# Patient Record
Sex: MALE | Race: BLACK OR AFRICAN AMERICAN | URBAN - METROPOLITAN AREA
[De-identification: names, ages, dates, MRNs, and addresses within clinical notes are randomized per-mention and may not be internally consistent; named-entity substitution may affect disease eponyms.]

---

## 2021-12-03 ENCOUNTER — APPOINTMENT (RX ONLY)
Dept: URBAN - METROPOLITAN AREA CLINIC 79 | Facility: CLINIC | Age: 26
Setting detail: DERMATOLOGY
End: 2021-12-03

## 2021-12-03 DIAGNOSIS — L50.8 OTHER URTICARIA: ICD-10-CM | Status: INADEQUATELY CONTROLLED

## 2021-12-03 DIAGNOSIS — B36.0 PITYRIASIS VERSICOLOR: ICD-10-CM | Status: INADEQUATELY CONTROLLED

## 2021-12-03 PROCEDURE — ? PRESCRIPTION

## 2021-12-03 PROCEDURE — ? FULL BODY SKIN EXAM - DECLINED

## 2021-12-03 PROCEDURE — ? COUNSELING

## 2021-12-03 PROCEDURE — 99204 OFFICE O/P NEW MOD 45 MIN: CPT

## 2021-12-03 RX ORDER — PREDNISONE 20 MG/1
TABLET ORAL
Qty: 30 | Refills: 0 | Status: ERX | COMMUNITY
Start: 2021-12-03

## 2021-12-03 RX ORDER — SALICYLIC ACID 3 G/100G
LOTION TOPICAL
Qty: 60 | Refills: 3 | Status: ERX | COMMUNITY
Start: 2021-12-03

## 2021-12-03 RX ORDER — KETOCONAZOLE 20 MG/G
CREAM TOPICAL BID
Qty: 60 | Refills: 3 | Status: ERX | COMMUNITY
Start: 2021-12-03

## 2021-12-03 RX ORDER — KETOCONAZOLE 20 MG/ML
SHAMPOO, SUSPENSION TOPICAL BID
Qty: 120 | Refills: 3 | Status: ERX | COMMUNITY
Start: 2021-12-03

## 2021-12-03 RX ADMIN — SALICYLIC ACID: 3 LOTION TOPICAL at 00:00

## 2021-12-03 RX ADMIN — PREDNISONE: 20 TABLET ORAL at 00:00

## 2021-12-03 RX ADMIN — KETOCONAZOLE: 20 CREAM TOPICAL at 00:00

## 2021-12-03 RX ADMIN — KETOCONAZOLE: 20 SHAMPOO, SUSPENSION TOPICAL at 00:00

## 2021-12-03 ASSESSMENT — LOCATION ZONE DERM
LOCATION ZONE: LEG
LOCATION ZONE: TRUNK
LOCATION ZONE: ARM

## 2021-12-03 ASSESSMENT — LOCATION SIMPLE DESCRIPTION DERM
LOCATION SIMPLE: ABDOMEN
LOCATION SIMPLE: RIGHT FOREARM
LOCATION SIMPLE: LEFT FOREARM
LOCATION SIMPLE: RIGHT THIGH
LOCATION SIMPLE: LEFT THIGH

## 2021-12-03 ASSESSMENT — LOCATION DETAILED DESCRIPTION DERM
LOCATION DETAILED: RIGHT ANTERIOR PROXIMAL THIGH
LOCATION DETAILED: PERIUMBILICAL SKIN
LOCATION DETAILED: LEFT ANTERIOR PROXIMAL THIGH
LOCATION DETAILED: RIGHT PROXIMAL DORSAL FOREARM
LOCATION DETAILED: LEFT PROXIMAL DORSAL FOREARM

## 2021-12-03 NOTE — HPI: HIVES (URTICARIA)
How Severe Are Your Hives?: moderate
Please Select The Phrase That Best Describes Your Hives.: individual welts do not stay in the same place for more than 24 hours
Is This A New Presentation, Or A Follow-Up?: Hives
Additional History: Patient states he takes Allegra and rash improves, but then recurs. He denies any SOB or difficulty breathing. He did have some swelling of lips which has resolved. He denies any new foods, medications, etc